# Patient Record
Sex: FEMALE
[De-identification: names, ages, dates, MRNs, and addresses within clinical notes are randomized per-mention and may not be internally consistent; named-entity substitution may affect disease eponyms.]

---

## 2018-02-01 ENCOUNTER — HOSPITAL ENCOUNTER (EMERGENCY)
Dept: HOSPITAL 42 - ED | Age: 53
Discharge: HOME | End: 2018-02-01
Payer: COMMERCIAL

## 2018-02-01 VITALS
SYSTOLIC BLOOD PRESSURE: 127 MMHG | OXYGEN SATURATION: 99 % | DIASTOLIC BLOOD PRESSURE: 82 MMHG | RESPIRATION RATE: 16 BRPM | HEART RATE: 75 BPM

## 2018-02-01 VITALS — BODY MASS INDEX: 30.7 KG/M2

## 2018-02-01 VITALS — TEMPERATURE: 98.9 F

## 2018-02-01 DIAGNOSIS — R11.2: ICD-10-CM

## 2018-02-01 DIAGNOSIS — I10: ICD-10-CM

## 2018-02-01 DIAGNOSIS — E11.9: ICD-10-CM

## 2018-02-01 DIAGNOSIS — R19.7: Primary | ICD-10-CM

## 2018-02-01 LAB
ALBUMIN SERPL-MCNC: 4.8 G/DL (ref 3–4.8)
ALBUMIN/GLOB SERPL: 1.3 {RATIO} (ref 1.1–1.8)
ALT SERPL-CCNC: 44 U/L (ref 7–56)
APPEARANCE UR: CLEAR
APTT BLD: 31.5 SECONDS (ref 25.1–36.5)
AST SERPL-CCNC: 41 U/L (ref 14–36)
BASOPHILS # BLD AUTO: 0.03 K/MM3 (ref 0–2)
BASOPHILS NFR BLD: 0.3 % (ref 0–3)
BILIRUB UR-MCNC: NEGATIVE MG/DL
BUN SERPL-MCNC: 24 MG/DL (ref 7–21)
CALCIUM SERPL-MCNC: 10.3 MG/DL (ref 8.4–10.5)
COLOR UR: YELLOW
EOSINOPHIL # BLD: 0.4 10*3/UL (ref 0–0.7)
EOSINOPHIL NFR BLD: 3.9 % (ref 1.5–5)
ERYTHROCYTE [DISTWIDTH] IN BLOOD BY AUTOMATED COUNT: 12.7 % (ref 11.5–14.5)
GFR NON-AFRICAN AMERICAN: > 60
GLUCOSE UR STRIP-MCNC: >=1000 MG/DL
GRANULOCYTES # BLD: 5.21 10*3/UL (ref 1.4–6.5)
GRANULOCYTES NFR BLD: 51.6 % (ref 50–68)
HGB BLD-MCNC: 15.6 G/DL (ref 12–16)
INR PPP: 1.03 (ref 0.93–1.08)
LEUKOCYTE ESTERASE UR-ACNC: NEGATIVE LEU/UL
LIPASE SERPL-CCNC: 172 U/L (ref 23–300)
LYMPHOCYTES # BLD: 3.8 10*3/UL (ref 1.2–3.4)
LYMPHOCYTES NFR BLD AUTO: 37.7 % (ref 22–35)
MCH RBC QN AUTO: 29.9 PG (ref 25–35)
MCHC RBC AUTO-ENTMCNC: 32.9 G/DL (ref 31–37)
MCV RBC AUTO: 90.8 FL (ref 80–105)
MONOCYTES # BLD AUTO: 0.7 10*3/UL (ref 0.1–0.6)
MONOCYTES NFR BLD: 6.5 % (ref 1–6)
PH UR STRIP: 6 [PH] (ref 4.7–8)
PLATELET # BLD: 323 10^3/UL (ref 120–450)
PMV BLD AUTO: 10.2 FL (ref 7–11)
PROT UR STRIP-MCNC: NEGATIVE MG/DL
PROTHROMBIN TIME: 11.8 SECONDS (ref 9.4–12.5)
RBC # BLD AUTO: 5.22 10^6/UL (ref 3.5–6.1)
RBC # UR STRIP: NEGATIVE /UL
SP GR UR STRIP: 1.01 (ref 1–1.03)
URINE NITRATE: NEGATIVE
UROBILINOGEN UR STRIP-ACNC: 0.2 E.U./DL
WBC # BLD AUTO: 10.1 10^3/UL (ref 4.5–11)

## 2018-02-01 PROCEDURE — 80053 COMPREHEN METABOLIC PANEL: CPT

## 2018-02-01 PROCEDURE — 96374 THER/PROPH/DIAG INJ IV PUSH: CPT

## 2018-02-01 PROCEDURE — 83690 ASSAY OF LIPASE: CPT

## 2018-02-01 PROCEDURE — 96375 TX/PRO/DX INJ NEW DRUG ADDON: CPT

## 2018-02-01 PROCEDURE — 81003 URINALYSIS AUTO W/O SCOPE: CPT

## 2018-02-01 PROCEDURE — 99284 EMERGENCY DEPT VISIT MOD MDM: CPT

## 2018-02-01 PROCEDURE — 85025 COMPLETE CBC W/AUTO DIFF WBC: CPT

## 2018-02-01 PROCEDURE — 85730 THROMBOPLASTIN TIME PARTIAL: CPT

## 2018-02-01 PROCEDURE — 85610 PROTHROMBIN TIME: CPT

## 2018-02-01 NOTE — ED PDOC
Arrival/HPI





- General


Chief Complaint: GI Problem


Time Seen by Provider: 18 15:14


Historian: Patient





- History of Present Illness


Narrative History of Present Illness (Text): 





18 15:31


53yo female with PMhx of NIDDM and hypertension present with 2days history of 

nausea, diarrhea and dyspepsia. Notes vomiting was last night and she had 

multiple episodes of diarrhea today. She denies abdominal pain, chest pain, 

sick contact, travel, melena, hematemesis, any other complaint.





Past Medical History





- Provider Review


Nursing Documentation Reviewed: Yes





- Infectious Disease


Hx of Infectious Diseases: None





- Tetanus Immunization


Tetanus Immunization: Unknown





- Cardiac


Hx Cardiac Disorders: Yes


Hx Hypertension: Yes


Other/Comment: PALPTATIONS





- Pulmonary


Hx Respiratory Disorders: Yes


Hx Asthma: Yes





- Neurological


Hx Neurological Disorder: No





- HEENT


Hx HEENT Disorder: No





- Renal


Hx Renal Disorder: No





- Endocrine/Metabolic


Hx Endocrine Disorders: Yes


Hx Diabetes Mellitus Type 2: Yes





- Hematological/Oncological


Hx Blood Disorders: No





- Integumentary


Hx Dermatological Disorder: No





- Musculoskeletal/Rheumatological


Hx Musculoskeletal Disorders: No


Hx Falls: No





- Gastrointestinal


Hx Gastrointestinal Disorders: No





- Genitourinary/Gynecological


Hx Genitourinary Disorders: No


Other/Comment: BREAST IMPLANTS





- Psychiatric


Hx Psychophysiologic Disorder: No


Hx Depression: No


Hx Emotional Abuse: No


Hx Physical Abuse: No


Hx Substance Use: No





- Surgical History


Hx Appendectomy: Yes


Hx  Section: Yes (x3)


Hx Cholecystectomy: Yes





- Anesthesia


Hx Anesthesia: Yes


Hx Anesthesia Reactions: No


Hx Malignant Hyperthermia: No





- Suicidal Assessment


Feels Threatened In Home Enviroment: No





Family/Social History





- Physician Review


Nursing Documentation Reviewed: Yes


Family/Social History: Unknown Family HX


Smoking Status: Never Smoked


Hx Alcohol Use: No


Hx Substance Use: No


Hx Substance Use Treatment: No





Allergies/Home Meds


Allergies/Adverse Reactions: 


Allergies





No Known Allergies Allergy (Verified 17 12:23)


 








Home Medications: 


 Home Meds











 Medication  Instructions  Recorded  Confirmed


 


Atorvastatin Calcium [Lipitor] 10 mg PO DAILY 14


 


Liraglutide [Victoza 2-Cheng] 6 mg SC .ONCE PER WEEK 14


 


Metformin HCl 1,000 mg PO BID 14


 


Metoprolol Succinate XL [Toprol XL] 25 mg PO BID 17


 


Dicyclomine [Bentyl] 1 tab PO BID 18


 


Esomeprazole Magnesium [Nexium] 1 tab PO DAILY 18


 


Valsartan [Diovan] 160 tab PO DAILY 18














Review of Systems





- Physician Review


All systems were reviewed & negative as marked: Yes





- Review of Systems


Constitutional: Normal


Eyes: Normal


ENT: Normal


Respiratory: Normal


Cardiovascular: Normal


Gastrointestinal: Diarrhea, Nausea, Vomiting.  absent: Abdominal Pain, 

Constipation, Hematochezia, Hematemesis


Genitourinary Female: Normal


Musculoskeletal: Normal


Skin: Normal


Neurological: Normal


Endocrine: Normal


Hemo/Lymphatic: Normal


Psychiatric: Normal





Physical Exam


Vital Signs Reviewed: Yes


Vital Signs











  Temp Pulse Resp BP Pulse Ox


 


 18 15:09  98.9 F  76  18  123/82  95











Temperature: Afebrile


Blood Pressure: Normal


Pulse: Regular


Respiratory Rate: Normal


Appearance: Positive for: Well-Appearing, Non-Toxic, Comfortable


Pain Distress: None


Mental Status: Positive for: Alert and Oriented X 3





- Systems Exam


Head: Present: Atraumatic, Normocephalic


Pupils: Present: PERRL


Extroacular Muscles: Present: EOMI


Conjunctiva: Present: Normal


Mouth: Present: Moist Mucous Membranes


Neck: Present: Normal Range of Motion


Respiratory/Chest: Present: Clear to Auscultation, Good Air Exchange.  No: 

Respiratory Distress, Accessory Muscle Use


Cardiovascular: Present: Regular Rate and Rhythm, Normal S1, S2.  No: Murmurs


Abdomen: Present: Normal Bowel Sounds, Other (Soft).  No: Tenderness, Distention

, Peritoneal Signs, Rebound, Guarding, McBurney's Point Tender, Rovsing's Sign 

Present


Back: Present: Normal Inspection


Upper Extremity: Present: Normal Inspection.  No: Cyanosis, Edema


Lower Extremity: Present: Normal Inspection.  No: Edema


Neurological: Present: GCS=15, CN II-XII Intact, Speech Normal


Skin: Present: Warm, Dry, Normal Color.  No: Rashes


Psychiatric: Present: Alert, Oriented x 3, Normal Insight, Normal Concentration





Medical Decision Making


ED Course and Treatment: 





18 16:47


PT in ED for stated history. Her PE was benign. she was able to tolerate food 

this morning. She was treated with zofran and loperamide in ED. Lab was 

nonspecific. Result was DW the pt. she was advised to follow BRAT diet. 

Referred to her PMd. Rx of Zofran given. Advised TRT ED for any new or 

worsening symptoms





- Lab Interpretations


Lab Results: 








 18 16:00 





 18 16:00 





 Lab Results





18 16:00: Sodium 140, Potassium 3.8, Chloride 107, Carbon Dioxide 17 L, 

Anion Gap 20, BUN 24 H, Creatinine 0.7, Est GFR (African Amer) > 60, Est GFR (

Non-Af Amer) > 60, Random Glucose 176 H, Calcium 10.3, Total Bilirubin 0.5, AST 

41 H, ALT 44, Alkaline Phosphatase 75, Total Protein 8.5 H, Albumin 4.8, 

Globulin 3.7, Albumin/Globulin Ratio 1.3, Lipase 172


18 16:00: PT 11.8, INR 1.03, APTT 31.5


18 16:00: WBC 10.1  D, RBC 5.22, Hgb 15.6, Hct 47.4, MCV 90.8, MCH 29.9, 

MCHC 32.9, RDW 12.7, Plt Count 323, MPV 10.2, Gran % 51.6, Lymph % (Auto) 37.7 H

, Mono % (Auto) 6.5 H, Eos % (Auto) 3.9, Baso % (Auto) 0.3, Gran # 5.21, Lymph 

# (Auto) 3.8 H, Mono # (Auto) 0.7 H, Eos # (Auto) 0.4, Baso # (Auto) 0.03


18 15:34: Urine Color Yellow, Urine Appearance Clear, Urine pH 6.0, Ur 

Specific Gravity 1.015, Urine Protein Negative, Urine Glucose (UA) >=1000, 

Urine Ketones Negative, Urine Blood Negative, Urine Nitrate Negative, Urine 

Bilirubin Negative, Urine Urobilinogen 0.2, Ur Leukocyte Esterase Negative











- Medication Orders


Current Medication Orders: 











Discontinued Medications





Famotidine (Pepcid)  20 mg IVP STAT STA


   Stop: 18 15:32


   Last Admin: 18 16:01  Dose: 20 mg





IVP Administration


 Document     18 16:01  HI  [facility]01[facility]18 16:01  [id number]


     Charges for Administration


      # of IVP Administrations                   1





Sodium Chloride (Sodium Chloride 0.9%)  1,000 mls @ 1,000 mls/hr IV .Q1H STA


   Stop: 18 16:30


   Last Admin: 18 16:01  Dose: 1,000 mls/hr





eMAR Start Stop


 Document     18 16:01  HI  (Rec: 18 16:01  [id number]


     Intravenous Solution


      Start Date                                 18


      Start Time                                 16:01





Loperamide HCl (Imodium)  4 mg PO ONCE STA


   Stop: 18 15:36


Ondansetron HCl (Zofran Inj)  4 mg IVP STAT STA


   Stop: 18 15:32


   Last Admin: 18 16:01  Dose: 4 mg





IVP Administration


 Document     18 16:01  HI  (Rec: 18 16:01  [id number]


     Charges for Administration


      # of IVP Administrations                   1














Disposition/Present on Arrival





- Present on Arrival


Any Indicators Present on Arrival: No


History of DVT/PE: No


History of Uncontrolled Diabetes: No


Urinary Catheter: No


History of Decub. Ulcer: No


History Surgical Site Infection Following: None





- Disposition


Have Diagnosis and Disposition been Completed?: Yes


Diagnosis: 


 Vomiting and diarrhea





Disposition: HOME/ ROUTINE


Disposition Time: 16:50


Patient Plan: Discharge


Condition: STABLE


Discharge Instructions (ExitCare):  Acute Nausea and Vomiting (ED), Acute 

Diarrhea (ED)


Additional Instructions: 


Follow BRAT diet, banana, plain rice, tea, crackers, apple suace


Follow up with your doctor


Return to ED for any new or worsening symptoms


Prescriptions: 


Ondansetron ODT [Zofran ODT] 4 mg PO Q6 #6 odt


Referrals: 


[facility] at [facility] [place] [Outside] - Follow up with primary


Forms:  [employer] (English)